# Patient Record
Sex: FEMALE | Race: WHITE | NOT HISPANIC OR LATINO | Employment: PART TIME | URBAN - METROPOLITAN AREA
[De-identification: names, ages, dates, MRNs, and addresses within clinical notes are randomized per-mention and may not be internally consistent; named-entity substitution may affect disease eponyms.]

---

## 2023-08-25 ENCOUNTER — HOSPITAL ENCOUNTER (OUTPATIENT)
Dept: DATA CONVERSION | Facility: HOSPITAL | Age: 43
Discharge: HOME | End: 2023-08-25
Payer: COMMERCIAL

## 2023-08-25 DIAGNOSIS — M54.2 CERVICALGIA: ICD-10-CM

## 2023-08-25 DIAGNOSIS — M54.12 RADICULOPATHY, CERVICAL REGION: ICD-10-CM

## 2023-09-17 ENCOUNTER — HOSPITAL ENCOUNTER (OUTPATIENT)
Dept: DATA CONVERSION | Facility: HOSPITAL | Age: 43
Discharge: HOME | End: 2023-09-17
Payer: COMMERCIAL

## 2023-09-17 DIAGNOSIS — M79.641 PAIN IN RIGHT HAND: ICD-10-CM

## 2023-09-17 DIAGNOSIS — M25.531 PAIN IN RIGHT WRIST: ICD-10-CM
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
## 2023-09-17 DIAGNOSIS — S62.300A UNSPECIFIED FRACTURE OF SECOND METACARPAL BONE, RIGHT HAND, INITIAL ENCOUNTER FOR CLOSED FRACTURE: ICD-10-CM

## 2023-09-17 DIAGNOSIS — S60.511A ABRASION OF RIGHT HAND, INITIAL ENCOUNTER: ICD-10-CM

## 2023-09-17 DIAGNOSIS — F17.210 NICOTINE DEPENDENCE, CIGARETTES, UNCOMPLICATED: ICD-10-CM

## 2023-09-25 PROBLEM — D25.9 UTERINE LEIOMYOMA: Status: ACTIVE | Noted: 2023-09-25

## 2023-09-25 PROBLEM — M54.12 CERVICAL RADICULOPATHY: Status: ACTIVE | Noted: 2023-09-25

## 2023-09-25 PROBLEM — I50.9 HEART FAILURE (MULTI): Status: ACTIVE | Noted: 2023-09-25

## 2023-09-25 PROBLEM — N93.9 ABNORMAL VAGINAL BLEEDING: Status: ACTIVE | Noted: 2023-09-25

## 2023-09-25 PROBLEM — S62.309A CLOSED FRACTURE OF METACARPAL BONE: Status: ACTIVE | Noted: 2023-09-25

## 2023-09-25 PROBLEM — E03.8 OTHER SPECIFIED HYPOTHYROIDISM: Status: ACTIVE | Noted: 2023-09-25

## 2023-09-25 PROBLEM — R42 LIGHTHEADEDNESS: Status: ACTIVE | Noted: 2023-09-25

## 2023-09-25 PROBLEM — K21.9 GASTROESOPHAGEAL REFLUX DISEASE: Status: ACTIVE | Noted: 2023-09-25

## 2023-09-25 PROBLEM — N94.6 DYSMENORRHEA: Status: ACTIVE | Noted: 2023-09-25

## 2023-09-25 RX ORDER — CLINDAMYCIN PHOSPHATE 10 MG/G
GEL TOPICAL
COMMUNITY
Start: 2023-01-30 | End: 2024-01-24 | Stop reason: ALTCHOICE

## 2023-10-16 PROBLEM — M79.641 PAIN IN RIGHT HAND: Status: ACTIVE | Noted: 2023-10-16

## 2023-10-16 PROBLEM — S62.309A: Status: ACTIVE | Noted: 2023-10-16

## 2023-10-16 RX ORDER — IBUPROFEN 600 MG/1
600 TABLET ORAL AS NEEDED
COMMUNITY

## 2023-10-23 ENCOUNTER — OFFICE VISIT (OUTPATIENT)
Dept: ORTHOPEDIC SURGERY | Facility: CLINIC | Age: 43
End: 2023-10-23
Payer: COMMERCIAL

## 2023-10-23 ENCOUNTER — HOSPITAL ENCOUNTER (OUTPATIENT)
Dept: RADIOLOGY | Facility: CLINIC | Age: 43
Discharge: HOME | End: 2023-10-23
Payer: COMMERCIAL

## 2023-10-23 VITALS — BODY MASS INDEX: 27.32 KG/M2 | WEIGHT: 170 LBS | HEIGHT: 66 IN

## 2023-10-23 DIAGNOSIS — S62.300D CLOSED NONDISPLACED FRACTURE OF SECOND METACARPAL BONE OF RIGHT HAND WITH ROUTINE HEALING, UNSPECIFIED PORTION OF METACARPAL, SUBSEQUENT ENCOUNTER: Primary | ICD-10-CM

## 2023-10-23 DIAGNOSIS — S62.91XA HAND FRACTURE, RIGHT: ICD-10-CM

## 2023-10-23 PROCEDURE — 73130 X-RAY EXAM OF HAND: CPT | Mod: RT,FY

## 2023-10-23 PROCEDURE — 99024 POSTOP FOLLOW-UP VISIT: CPT | Performed by: ORTHOPAEDIC SURGERY

## 2023-10-23 RX ORDER — GABAPENTIN 300 MG/1
300 CAPSULE ORAL 2 TIMES DAILY
COMMUNITY
Start: 2023-10-18 | End: 2024-01-24

## 2023-10-23 ASSESSMENT — PATIENT HEALTH QUESTIONNAIRE - PHQ9
1. LITTLE INTEREST OR PLEASURE IN DOING THINGS: NOT AT ALL
SUM OF ALL RESPONSES TO PHQ9 QUESTIONS 1 AND 2: 0
2. FEELING DOWN, DEPRESSED OR HOPELESS: NOT AT ALL

## 2023-10-23 ASSESSMENT — PAIN SCALES - GENERAL: PAINLEVEL_OUTOF10: 5 - MODERATE PAIN

## 2023-10-23 ASSESSMENT — ENCOUNTER SYMPTOMS
OCCASIONAL FEELINGS OF UNSTEADINESS: 0
LOSS OF SENSATION IN FEET: 0
DEPRESSION: 0

## 2023-10-23 ASSESSMENT — PAIN - FUNCTIONAL ASSESSMENT: PAIN_FUNCTIONAL_ASSESSMENT: 0-10

## 2023-10-23 NOTE — PROGRESS NOTES
Subjective    Patient ID: Yumiko Carranza is a 43 y.o. female.    Chief Complaint: Follow-up and Pain of the Right Hand     Last Surgery: No surgery found  Last Surgery Date: No surgery found    Previously evaluated this 43-year-old young woman for fracture of her right second metacarpal and treated her nonoperatively with splinting.  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        Objective   Ortho Exam  Right hand and upper extremity: The splint is removed.  Right second metacarpal position is clinically satisfactory.  There is some persistent tenderness over the right second metacarpal.  Skin is intact.  Neurovascular is intact.  X-rays of the right hand done and read in the office today show that the previously noted fracture of the right second metacarpal is maintained in overall satisfactory position.  Early callus appears to be present consistent with early healing.  I reviewed these x-rays with the patient in the office today.  Image Results:  XR hand right 3+ views  These images are not reportable by radiology and will not be interpreted   by  Radiologists.  Options are discussed with the patient in detail.  The patient's request a new splint is fabricated for the patient in the office today to protect her right second metacarpal fracture.  The patient is instructed regarding activity modification and risk for further injury with falling or trauma or bumping, maintaining the splint while working,, ice, elevation, physician directed at home gentle strengthening and ROM exercises, and the appropriate use of Tylenol as needed for pain with its potential adverse reactions and side effects. The patient understands.  Return in 2 months for reevaluation and melinda-ray of the right hand or sooner as needed.  Please note that this report has been  produced using speech recognition software.  It may contain errors related to grammar, punctuation or spelling.  Electronically signed, but not reviewed.    Assessment/Plan   Encounter Diagnoses:  Closed nondisplaced fracture of second metacarpal bone of right hand with routine healing, unspecified portion of metacarpal, subsequent encounter    No orders of the defined types were placed in this encounter.    No follow-ups on file.

## 2024-01-24 ENCOUNTER — OFFICE VISIT (OUTPATIENT)
Dept: ORTHOPEDIC SURGERY | Facility: CLINIC | Age: 44
End: 2024-01-24
Payer: COMMERCIAL

## 2024-01-24 ENCOUNTER — HOSPITAL ENCOUNTER (OUTPATIENT)
Dept: RADIOLOGY | Facility: CLINIC | Age: 44
Discharge: HOME | End: 2024-01-24
Payer: COMMERCIAL

## 2024-01-24 DIAGNOSIS — S62.300D CLOSED NONDISPLACED FRACTURE OF SECOND METACARPAL BONE OF RIGHT HAND WITH ROUTINE HEALING, UNSPECIFIED PORTION OF METACARPAL, SUBSEQUENT ENCOUNTER: Primary | ICD-10-CM

## 2024-01-24 DIAGNOSIS — S62.109A WRIST FRACTURE: ICD-10-CM

## 2024-01-24 PROBLEM — E78.00 PURE HYPERCHOLESTEROLEMIA: Status: ACTIVE | Noted: 2020-10-28

## 2024-01-24 PROCEDURE — 73110 X-RAY EXAM OF WRIST: CPT | Mod: RT

## 2024-01-24 PROCEDURE — 99213 OFFICE O/P EST LOW 20 MIN: CPT | Performed by: ORTHOPAEDIC SURGERY

## 2024-01-24 PROCEDURE — 73110 X-RAY EXAM OF WRIST: CPT | Mod: RIGHT SIDE | Performed by: ORTHOPAEDIC SURGERY

## 2024-01-24 ASSESSMENT — PAIN DESCRIPTION - DESCRIPTORS: DESCRIPTORS: THROBBING

## 2024-01-24 ASSESSMENT — PAIN SCALES - GENERAL: PAINLEVEL_OUTOF10: 2

## 2024-01-24 ASSESSMENT — PAIN - FUNCTIONAL ASSESSMENT: PAIN_FUNCTIONAL_ASSESSMENT: 0-10

## 2024-01-24 NOTE — PROGRESS NOTES
Subjective    Patient ID: Yumiko Carranza is a 43 y.o. female.    Chief Complaint: Follow-up and Pain of the Right Hand     Last Surgery: No surgery found  Last Surgery Date: No surgery found    I previously evaluated this 43-year-old young woman for fracture of her right second metacarpal and treated her nonoperatively with splinting.  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        Objective   Ortho Exam  Right hand and upper extremity: The splint is removed.  Right second metacarpal position is clinically satisfactory.  There is some persistent tenderness over the right second metacarpal.  Skin is intact.  Neurovascular is intact.  X-rays of the right hand done and read in the office today show that the previously noted fracture of the right second metacarpal is maintained in overall satisfactory position.  Early callus appears to be present consistent with early healing.  I reviewed these x-rays with the patient in the office today.  Image Results:  XR hand right 3+ views  These images are not reportable by radiology and will not be interpreted   by  Radiologists.  Options are discussed with the patient in detail.   The patient is instructed regarding activity modification and risk for further injury with falling or trauma or bumping, maintaining the splint while working,, ice, elevation, physician directed at home gentle strengthening and ROM exercises, and the appropriate use of Tylenol as needed for pain with its potential adverse reactions and side effects. The patient understands.  Return ias needed.  Please note that this report has been produced using speech recognition software.  It may contain errors related to grammar, punctuation or spelling.  Electronically signed, but not reviewed.    Assessment/Plan   Encounter Diagnoses:  Closed nondisplaced fracture of second  metacarpal bone of right hand with routine healing, unspecified portion of metacarpal, subsequent encounter    No orders of the defined types were placed in this encounter.    No follow-ups on file.

## 2024-02-20 ENCOUNTER — APPOINTMENT (OUTPATIENT)
Dept: CARDIOLOGY | Facility: HOSPITAL | Age: 44
End: 2024-02-20
Payer: COMMERCIAL

## 2024-02-20 ENCOUNTER — APPOINTMENT (OUTPATIENT)
Dept: RADIOLOGY | Facility: HOSPITAL | Age: 44
End: 2024-02-20
Payer: COMMERCIAL

## 2024-02-20 ENCOUNTER — HOSPITAL ENCOUNTER (EMERGENCY)
Facility: HOSPITAL | Age: 44
Discharge: HOME | End: 2024-02-20
Attending: STUDENT IN AN ORGANIZED HEALTH CARE EDUCATION/TRAINING PROGRAM
Payer: COMMERCIAL

## 2024-02-20 VITALS
HEART RATE: 61 BPM | SYSTOLIC BLOOD PRESSURE: 98 MMHG | BODY MASS INDEX: 25.9 KG/M2 | DIASTOLIC BLOOD PRESSURE: 59 MMHG | OXYGEN SATURATION: 100 % | TEMPERATURE: 98.1 F | RESPIRATION RATE: 22 BRPM | HEIGHT: 67 IN | WEIGHT: 165 LBS

## 2024-02-20 DIAGNOSIS — R11.0 NAUSEA: Primary | ICD-10-CM

## 2024-02-20 DIAGNOSIS — R07.9 CHEST PAIN, UNSPECIFIED TYPE: ICD-10-CM

## 2024-02-20 LAB
ALBUMIN SERPL-MCNC: 4.8 G/DL (ref 3.5–5)
ALP BLD-CCNC: 47 U/L (ref 35–125)
ALT SERPL-CCNC: 13 U/L (ref 5–40)
ANION GAP SERPL CALC-SCNC: 12 MMOL/L
AST SERPL-CCNC: 17 U/L (ref 5–40)
BASOPHILS # BLD AUTO: 0.05 X10*3/UL (ref 0–0.1)
BASOPHILS NFR BLD AUTO: 0.5 %
BILIRUB SERPL-MCNC: 0.5 MG/DL (ref 0.1–1.2)
BUN SERPL-MCNC: 10 MG/DL (ref 8–25)
CALCIUM SERPL-MCNC: 9.7 MG/DL (ref 8.5–10.4)
CHLORIDE SERPL-SCNC: 100 MMOL/L (ref 97–107)
CO2 SERPL-SCNC: 24 MMOL/L (ref 24–31)
CREAT SERPL-MCNC: 0.6 MG/DL (ref 0.4–1.6)
EGFRCR SERPLBLD CKD-EPI 2021: >90 ML/MIN/1.73M*2
EOSINOPHIL # BLD AUTO: 0.23 X10*3/UL (ref 0–0.7)
EOSINOPHIL NFR BLD AUTO: 2.2 %
ERYTHROCYTE [DISTWIDTH] IN BLOOD BY AUTOMATED COUNT: 15.9 % (ref 11.5–14.5)
GLUCOSE SERPL-MCNC: 85 MG/DL (ref 65–99)
HCT VFR BLD AUTO: 41.3 % (ref 36–46)
HGB BLD-MCNC: 13.6 G/DL (ref 12–16)
IMM GRANULOCYTES # BLD AUTO: 0.03 X10*3/UL (ref 0–0.7)
IMM GRANULOCYTES NFR BLD AUTO: 0.3 % (ref 0–0.9)
LIPASE SERPL-CCNC: 25 U/L (ref 16–63)
LYMPHOCYTES # BLD AUTO: 3.6 X10*3/UL (ref 1.2–4.8)
LYMPHOCYTES NFR BLD AUTO: 34.1 %
MAGNESIUM SERPL-MCNC: 2 MG/DL (ref 1.6–3.1)
MCH RBC QN AUTO: 27.8 PG (ref 26–34)
MCHC RBC AUTO-ENTMCNC: 32.9 G/DL (ref 32–36)
MCV RBC AUTO: 84 FL (ref 80–100)
MONOCYTES # BLD AUTO: 0.67 X10*3/UL (ref 0.1–1)
MONOCYTES NFR BLD AUTO: 6.3 %
NEUTROPHILS # BLD AUTO: 5.98 X10*3/UL (ref 1.2–7.7)
NEUTROPHILS NFR BLD AUTO: 56.6 %
NRBC BLD-RTO: 0 /100 WBCS (ref 0–0)
PLATELET # BLD AUTO: 298 X10*3/UL (ref 150–450)
POTASSIUM SERPL-SCNC: 3.8 MMOL/L (ref 3.4–5.1)
PROT SERPL-MCNC: 8.2 G/DL (ref 5.9–7.9)
RBC # BLD AUTO: 4.9 X10*6/UL (ref 4–5.2)
SODIUM SERPL-SCNC: 136 MMOL/L (ref 133–145)
TROPONIN T SERPL-MCNC: <6 NG/L
WBC # BLD AUTO: 10.6 X10*3/UL (ref 4.4–11.3)

## 2024-02-20 PROCEDURE — 83735 ASSAY OF MAGNESIUM: CPT | Performed by: STUDENT IN AN ORGANIZED HEALTH CARE EDUCATION/TRAINING PROGRAM

## 2024-02-20 PROCEDURE — 84484 ASSAY OF TROPONIN QUANT: CPT | Performed by: STUDENT IN AN ORGANIZED HEALTH CARE EDUCATION/TRAINING PROGRAM

## 2024-02-20 PROCEDURE — 93005 ELECTROCARDIOGRAM TRACING: CPT | Mod: 59

## 2024-02-20 PROCEDURE — 99284 EMERGENCY DEPT VISIT MOD MDM: CPT | Mod: 25

## 2024-02-20 PROCEDURE — 2500000004 HC RX 250 GENERAL PHARMACY W/ HCPCS (ALT 636 FOR OP/ED): Performed by: STUDENT IN AN ORGANIZED HEALTH CARE EDUCATION/TRAINING PROGRAM

## 2024-02-20 PROCEDURE — 85025 COMPLETE CBC W/AUTO DIFF WBC: CPT | Performed by: STUDENT IN AN ORGANIZED HEALTH CARE EDUCATION/TRAINING PROGRAM

## 2024-02-20 PROCEDURE — 80053 COMPREHEN METABOLIC PANEL: CPT | Performed by: STUDENT IN AN ORGANIZED HEALTH CARE EDUCATION/TRAINING PROGRAM

## 2024-02-20 PROCEDURE — 96361 HYDRATE IV INFUSION ADD-ON: CPT

## 2024-02-20 PROCEDURE — 83690 ASSAY OF LIPASE: CPT | Performed by: STUDENT IN AN ORGANIZED HEALTH CARE EDUCATION/TRAINING PROGRAM

## 2024-02-20 PROCEDURE — 93005 ELECTROCARDIOGRAM TRACING: CPT

## 2024-02-20 PROCEDURE — 96375 TX/PRO/DX INJ NEW DRUG ADDON: CPT

## 2024-02-20 PROCEDURE — 96374 THER/PROPH/DIAG INJ IV PUSH: CPT

## 2024-02-20 PROCEDURE — 71046 X-RAY EXAM CHEST 2 VIEWS: CPT

## 2024-02-20 PROCEDURE — 36415 COLL VENOUS BLD VENIPUNCTURE: CPT | Performed by: STUDENT IN AN ORGANIZED HEALTH CARE EDUCATION/TRAINING PROGRAM

## 2024-02-20 RX ORDER — FAMOTIDINE 10 MG/ML
20 INJECTION INTRAVENOUS ONCE
Status: COMPLETED | OUTPATIENT
Start: 2024-02-20 | End: 2024-02-20

## 2024-02-20 RX ORDER — ONDANSETRON HYDROCHLORIDE 2 MG/ML
4 INJECTION, SOLUTION INTRAVENOUS ONCE
Status: COMPLETED | OUTPATIENT
Start: 2024-02-20 | End: 2024-02-20

## 2024-02-20 RX ADMIN — SODIUM CHLORIDE 1000 ML: 900 INJECTION, SOLUTION INTRAVENOUS at 19:13

## 2024-02-20 RX ADMIN — ONDANSETRON 4 MG: 2 INJECTION INTRAMUSCULAR; INTRAVENOUS at 19:13

## 2024-02-20 RX ADMIN — FAMOTIDINE 20 MG: 10 INJECTION, SOLUTION INTRAVENOUS at 20:10

## 2024-02-20 ASSESSMENT — LIFESTYLE VARIABLES
EVER HAD A DRINK FIRST THING IN THE MORNING TO STEADY YOUR NERVES TO GET RID OF A HANGOVER: NO
HAVE PEOPLE ANNOYED YOU BY CRITICIZING YOUR DRINKING: NO
EVER FELT BAD OR GUILTY ABOUT YOUR DRINKING: NO
HAVE YOU EVER FELT YOU SHOULD CUT DOWN ON YOUR DRINKING: NO

## 2024-02-20 ASSESSMENT — PAIN - FUNCTIONAL ASSESSMENT: PAIN_FUNCTIONAL_ASSESSMENT: 0-10

## 2024-02-20 ASSESSMENT — COLUMBIA-SUICIDE SEVERITY RATING SCALE - C-SSRS
6. HAVE YOU EVER DONE ANYTHING, STARTED TO DO ANYTHING, OR PREPARED TO DO ANYTHING TO END YOUR LIFE?: NO
2. HAVE YOU ACTUALLY HAD ANY THOUGHTS OF KILLING YOURSELF?: NO
1. IN THE PAST MONTH, HAVE YOU WISHED YOU WERE DEAD OR WISHED YOU COULD GO TO SLEEP AND NOT WAKE UP?: NO

## 2024-02-20 ASSESSMENT — PAIN SCALES - GENERAL
PAINLEVEL_OUTOF10: 2
PAINLEVEL_OUTOF10: 2

## 2024-02-21 LAB
ATRIAL RATE: 57 BPM
P AXIS: 48 DEGREES
P OFFSET: 201 MS
P ONSET: 144 MS
PR INTERVAL: 146 MS
Q ONSET: 217 MS
QRS COUNT: 10 BEATS
QRS DURATION: 94 MS
QT INTERVAL: 434 MS
QTC CALCULATION(BAZETT): 422 MS
QTC FREDERICIA: 426 MS
R AXIS: 76 DEGREES
T AXIS: 59 DEGREES
T OFFSET: 434 MS
VENTRICULAR RATE: 57 BPM

## 2024-02-21 ASSESSMENT — HEART SCORE
TROPONIN: LESS THAN OR EQUAL TO NORMAL LIMIT
RISK FACTORS: 1-2 RISK FACTORS
ECG: NORMAL
AGE: <45
HISTORY: SLIGHTLY SUSPICIOUS
HEART SCORE: 1

## 2024-02-21 NOTE — ED NOTES
Report received. This RN assumed care. IV placed by medic. Labs sent. RN placed pt on the monitor and medicated. Call light in reach. No distress noted. Plan of care ongoing.      Mercedes Grimes RN  02/20/24 0554

## 2024-02-21 NOTE — ED NOTES
Discharge planning discussed with pt and daughter. Pt had no questions and agreed to follow up care. Pt ambulatory at discharge with no distress.      Mercedes Grimes RN  02/20/24 6524

## 2024-02-24 ENCOUNTER — APPOINTMENT (OUTPATIENT)
Dept: RADIOLOGY | Facility: HOSPITAL | Age: 44
End: 2024-02-24
Payer: COMMERCIAL

## 2024-02-24 ENCOUNTER — APPOINTMENT (OUTPATIENT)
Dept: CARDIOLOGY | Facility: HOSPITAL | Age: 44
End: 2024-02-24
Payer: COMMERCIAL

## 2024-02-24 ENCOUNTER — HOSPITAL ENCOUNTER (EMERGENCY)
Facility: HOSPITAL | Age: 44
Discharge: HOME | End: 2024-02-24
Attending: STUDENT IN AN ORGANIZED HEALTH CARE EDUCATION/TRAINING PROGRAM
Payer: COMMERCIAL

## 2024-02-24 VITALS
HEIGHT: 66 IN | RESPIRATION RATE: 16 BRPM | HEART RATE: 72 BPM | WEIGHT: 170 LBS | BODY MASS INDEX: 27.32 KG/M2 | OXYGEN SATURATION: 98 % | DIASTOLIC BLOOD PRESSURE: 74 MMHG | TEMPERATURE: 98.2 F | SYSTOLIC BLOOD PRESSURE: 125 MMHG

## 2024-02-24 DIAGNOSIS — R10.13 ABDOMINAL PAIN, EPIGASTRIC: Primary | ICD-10-CM

## 2024-02-24 LAB
ALBUMIN SERPL-MCNC: 4.4 G/DL (ref 3.5–5)
ALP BLD-CCNC: 48 U/L (ref 35–125)
ALT SERPL-CCNC: 12 U/L (ref 5–40)
ANION GAP SERPL CALC-SCNC: 9 MMOL/L
APPEARANCE UR: CLEAR
AST SERPL-CCNC: 16 U/L (ref 5–40)
BACTERIA #/AREA URNS AUTO: ABNORMAL /HPF
BASOPHILS # BLD AUTO: 0.06 X10*3/UL (ref 0–0.1)
BASOPHILS NFR BLD AUTO: 0.7 %
BILIRUB SERPL-MCNC: 0.3 MG/DL (ref 0.1–1.2)
BILIRUB UR STRIP.AUTO-MCNC: NEGATIVE MG/DL
BUN SERPL-MCNC: 11 MG/DL (ref 8–25)
CALCIUM SERPL-MCNC: 9.3 MG/DL (ref 8.5–10.4)
CHLORIDE SERPL-SCNC: 100 MMOL/L (ref 97–107)
CO2 SERPL-SCNC: 27 MMOL/L (ref 24–31)
COLOR UR: COLORLESS
CREAT SERPL-MCNC: 0.7 MG/DL (ref 0.4–1.6)
EGFRCR SERPLBLD CKD-EPI 2021: >90 ML/MIN/1.73M*2
EOSINOPHIL # BLD AUTO: 0.33 X10*3/UL (ref 0–0.7)
EOSINOPHIL NFR BLD AUTO: 4 %
ERYTHROCYTE [DISTWIDTH] IN BLOOD BY AUTOMATED COUNT: 15.6 % (ref 11.5–14.5)
GLUCOSE SERPL-MCNC: 91 MG/DL (ref 65–99)
GLUCOSE UR STRIP.AUTO-MCNC: NORMAL MG/DL
HCG SERPL-ACNC: <1 MIU/ML
HCT VFR BLD AUTO: 41.3 % (ref 36–46)
HGB BLD-MCNC: 13.4 G/DL (ref 12–16)
IMM GRANULOCYTES # BLD AUTO: 0.02 X10*3/UL (ref 0–0.7)
IMM GRANULOCYTES NFR BLD AUTO: 0.2 % (ref 0–0.9)
KETONES UR STRIP.AUTO-MCNC: NEGATIVE MG/DL
LEUKOCYTE ESTERASE UR QL STRIP.AUTO: ABNORMAL
LIPASE SERPL-CCNC: 28 U/L (ref 16–63)
LYMPHOCYTES # BLD AUTO: 2.54 X10*3/UL (ref 1.2–4.8)
LYMPHOCYTES NFR BLD AUTO: 30.5 %
MCH RBC QN AUTO: 27.1 PG (ref 26–34)
MCHC RBC AUTO-ENTMCNC: 32.4 G/DL (ref 32–36)
MCV RBC AUTO: 84 FL (ref 80–100)
MONOCYTES # BLD AUTO: 0.52 X10*3/UL (ref 0.1–1)
MONOCYTES NFR BLD AUTO: 6.3 %
NEUTROPHILS # BLD AUTO: 4.85 X10*3/UL (ref 1.2–7.7)
NEUTROPHILS NFR BLD AUTO: 58.3 %
NITRITE UR QL STRIP.AUTO: NEGATIVE
NRBC BLD-RTO: 0 /100 WBCS (ref 0–0)
PH UR STRIP.AUTO: 6.5 [PH]
PLATELET # BLD AUTO: 304 X10*3/UL (ref 150–450)
POTASSIUM SERPL-SCNC: 3.8 MMOL/L (ref 3.4–5.1)
PROT SERPL-MCNC: 7.6 G/DL (ref 5.9–7.9)
PROT UR STRIP.AUTO-MCNC: NEGATIVE MG/DL
RBC # BLD AUTO: 4.94 X10*6/UL (ref 4–5.2)
RBC # UR STRIP.AUTO: NEGATIVE /UL
RBC #/AREA URNS AUTO: ABNORMAL /HPF
SODIUM SERPL-SCNC: 136 MMOL/L (ref 133–145)
SP GR UR STRIP.AUTO: 1.05
SQUAMOUS #/AREA URNS AUTO: ABNORMAL /HPF
TROPONIN T SERPL-MCNC: <6 NG/L
UROBILINOGEN UR STRIP.AUTO-MCNC: NORMAL MG/DL
WBC # BLD AUTO: 8.3 X10*3/UL (ref 4.4–11.3)
WBC #/AREA URNS AUTO: ABNORMAL /HPF

## 2024-02-24 PROCEDURE — 96361 HYDRATE IV INFUSION ADD-ON: CPT

## 2024-02-24 PROCEDURE — 74177 CT ABD & PELVIS W/CONTRAST: CPT

## 2024-02-24 PROCEDURE — 87086 URINE CULTURE/COLONY COUNT: CPT | Mod: WESLAB

## 2024-02-24 PROCEDURE — 83690 ASSAY OF LIPASE: CPT

## 2024-02-24 PROCEDURE — 36415 COLL VENOUS BLD VENIPUNCTURE: CPT

## 2024-02-24 PROCEDURE — 96375 TX/PRO/DX INJ NEW DRUG ADDON: CPT

## 2024-02-24 PROCEDURE — 81001 URINALYSIS AUTO W/SCOPE: CPT

## 2024-02-24 PROCEDURE — 80053 COMPREHEN METABOLIC PANEL: CPT

## 2024-02-24 PROCEDURE — 2500000004 HC RX 250 GENERAL PHARMACY W/ HCPCS (ALT 636 FOR OP/ED)

## 2024-02-24 PROCEDURE — 2550000001 HC RX 255 CONTRASTS

## 2024-02-24 PROCEDURE — 85025 COMPLETE CBC W/AUTO DIFF WBC: CPT

## 2024-02-24 PROCEDURE — 93005 ELECTROCARDIOGRAM TRACING: CPT

## 2024-02-24 PROCEDURE — 96374 THER/PROPH/DIAG INJ IV PUSH: CPT | Mod: 59

## 2024-02-24 PROCEDURE — 96372 THER/PROPH/DIAG INJ SC/IM: CPT

## 2024-02-24 PROCEDURE — 84702 CHORIONIC GONADOTROPIN TEST: CPT

## 2024-02-24 PROCEDURE — 2550000001 HC RX 255 CONTRASTS: Performed by: STUDENT IN AN ORGANIZED HEALTH CARE EDUCATION/TRAINING PROGRAM

## 2024-02-24 PROCEDURE — 84484 ASSAY OF TROPONIN QUANT: CPT

## 2024-02-24 PROCEDURE — 99285 EMERGENCY DEPT VISIT HI MDM: CPT | Mod: 25

## 2024-02-24 RX ORDER — SUCRALFATE 1 G/1
1 TABLET ORAL
Qty: 20 TABLET | Refills: 0 | Status: SHIPPED | OUTPATIENT
Start: 2024-02-24 | End: 2024-03-05

## 2024-02-24 RX ORDER — ONDANSETRON HYDROCHLORIDE 2 MG/ML
4 INJECTION, SOLUTION INTRAVENOUS ONCE
Status: COMPLETED | OUTPATIENT
Start: 2024-02-24 | End: 2024-02-24

## 2024-02-24 RX ORDER — KETOROLAC TROMETHAMINE 30 MG/ML
15 INJECTION, SOLUTION INTRAMUSCULAR; INTRAVENOUS ONCE
Status: COMPLETED | OUTPATIENT
Start: 2024-02-24 | End: 2024-02-24

## 2024-02-24 RX ORDER — FAMOTIDINE 10 MG/ML
20 INJECTION INTRAVENOUS ONCE
Status: COMPLETED | OUTPATIENT
Start: 2024-02-24 | End: 2024-02-24

## 2024-02-24 RX ORDER — ALUMINUM HYDROXIDE AND MAGNESIUM CARBONATE 95; 358 MG/15ML; MG/15ML
5 LIQUID ORAL EVERY 6 HOURS PRN
Qty: 355 ML | Refills: 0 | Status: SHIPPED | OUTPATIENT
Start: 2024-02-24

## 2024-02-24 RX ADMIN — ONDANSETRON 4 MG: 2 INJECTION INTRAMUSCULAR; INTRAVENOUS at 11:39

## 2024-02-24 RX ADMIN — FAMOTIDINE 20 MG: 10 INJECTION, SOLUTION INTRAVENOUS at 11:39

## 2024-02-24 RX ADMIN — SODIUM CHLORIDE 1000 ML: 900 INJECTION, SOLUTION INTRAVENOUS at 11:38

## 2024-02-24 RX ADMIN — IOHEXOL 75 ML: 350 INJECTION, SOLUTION INTRAVENOUS at 12:30

## 2024-02-24 RX ADMIN — KETOROLAC TROMETHAMINE 15 MG: 30 INJECTION INTRAMUSCULAR; INTRAVENOUS at 11:39

## 2024-02-24 ASSESSMENT — LIFESTYLE VARIABLES
HAVE PEOPLE ANNOYED YOU BY CRITICIZING YOUR DRINKING: NO
EVER HAD A DRINK FIRST THING IN THE MORNING TO STEADY YOUR NERVES TO GET RID OF A HANGOVER: NO
EVER FELT BAD OR GUILTY ABOUT YOUR DRINKING: NO
HAVE YOU EVER FELT YOU SHOULD CUT DOWN ON YOUR DRINKING: NO

## 2024-02-24 ASSESSMENT — COLUMBIA-SUICIDE SEVERITY RATING SCALE - C-SSRS
2. HAVE YOU ACTUALLY HAD ANY THOUGHTS OF KILLING YOURSELF?: NO
6. HAVE YOU EVER DONE ANYTHING, STARTED TO DO ANYTHING, OR PREPARED TO DO ANYTHING TO END YOUR LIFE?: NO
1. IN THE PAST MONTH, HAVE YOU WISHED YOU WERE DEAD OR WISHED YOU COULD GO TO SLEEP AND NOT WAKE UP?: NO

## 2024-02-24 ASSESSMENT — PAIN DESCRIPTION - DESCRIPTORS: DESCRIPTORS: CRAMPING

## 2024-02-24 NOTE — ED PROVIDER NOTES
HPI   Chief Complaint   Patient presents with    Abdominal Pain       43-year-old female with no significant past medical history presenting for epigastric abdominal pain that has been ongoing for over the past week.  Patient states the pain has been constant.  She states she has not been taking anything for the pain.  She states she was seen in the ER a few days ago for epigastric pain with chest pain and her workup was found to be negative she was told to take over-the-counter Pepcid.  Patient states that she bought Pepcid but has not been taking the medication.  She is endorsing 1 episode of nausea and vomiting but otherwise no repeat episodes.  No fever or chills.  No change in bowel habitus.  No chest pain, shortness of breath, headache, dizziness.  No urinary symptoms.  All other review of systems otherwise negative.                        [place] Coma Scale Score: 15                     Patient History   No past medical history on file.  No past surgical history on file.  Family History   Problem Relation Name Age of Onset    Hypertension Mother      Heart disease Father      Asthma Sister       Social History     Tobacco Use    Smoking status: Every Day     Packs/day: .5     Types: Cigarettes     Passive exposure: Current    Smokeless tobacco: Never   Vaping Use    Vaping Use: Not on file   Substance Use Topics    Alcohol use: Yes     Comment: on occassion    Drug use: Never       Physical Exam   ED Triage Vitals [02/24/24 1026]   Temperature Heart Rate Respirations BP   36.8 °C (98.2 °F) 70 14 123/72      Pulse Ox Temp src Heart Rate Source Patient Position   100 % -- -- --      BP Location FiO2 (%)     -- --       Physical Exam  Vitals and nursing note reviewed.   Constitutional:       General: She is not in acute distress.     Appearance: She is well-developed.      Comments: Resting comfortably in hospital chair   HENT:      Head: Normocephalic and atraumatic.   Eyes:      Extraocular Movements:  Extraocular movements intact.      Pupils: Pupils are equal, round, and reactive to light.   Cardiovascular:      Rate and Rhythm: Normal rate and regular rhythm.      Heart sounds: Normal heart sounds.   Pulmonary:      Effort: Pulmonary effort is normal.      Breath sounds: Normal breath sounds. No wheezing, rhonchi or rales.   Abdominal:      Comments: Nondistended.  Tenderness in the epigastric region.  Otherwise nontender.   Skin:     General: Skin is warm and dry.      Coloration: Skin is not jaundiced or pale.   Neurological:      General: No focal deficit present.      Mental Status: She is alert and oriented to person, place, and time.   Psychiatric:         Mood and Affect: Mood normal.         Behavior: Behavior normal.         ED Course & MDM   ED Course as of 02/24/24 1615   Sat Feb 24, 2024   1419 EKG Time:1034  EKG Interpretation time:1035  EKG Interpretation: EKG shows normal sinus rhythm with a rate of 75 bpm, normal axis, QTc 428, no evidence of STEMI.    EKG was interpreted by myself independently [JL]   2204 Attending MDM:  43-year-old female presents emergency room with epigastric abdominal pain.  Patient was seen in the emergency room a few days ago and workup at that time was unremarkable and instructed to follow-up outpatient.  Patient was prescribed famotidine but has not been consistent in taking it at home.  Patient expresses aversions to acidic foods, salty foods, oily foods and has been slowly increasing her oral intake with no significant tenderness but noting continued epigastric abdominal pain.  She otherwise denies any significant vomiting, diarrhea, constipation, fevers, chills.    Vital signs as interpreted by me: Afebrile, nontachycardic, normotensive, 100% on room air    Exam:    Constitutional: No acute distress. Resting comfortably.   Head: Normocephalic, atraumatic.   Eyes: Pupils equal bilaterally, EOM grossly intact, conjunctiva normal.  Mouth/Throat: Oropharynx is clear,  moist mucus membranes.   Neck: Supple. No lymphadenopathy.  Cardiovascular: Regular rate and regular rhythm. Extremities are well-perfused.    Pulmonary/Chest: No respiratory distress, breathing comfortably on room air.    Abdominal: Soft, non-tender, non-distended. No rebound or guarding.   Musculoskeletal: No lower extremity edema.       Skin: Warm, dry, and intact.   Neurological: Patient is oriented to person, place, time, and situation. Face symmetric, hearing intact to voice, speech normal. Moves all extremities.   Psych: Mood, affect, thought content, and judgment normal.    Differential includes but not limited to:  Pancreatitis versus UTI versus nephrolithiasis versus pyelonephritis versus appendicitis versus GERD versus peptic ulcer [DH]   1440 Lab work as interpreted by me shows no significant leukocytosis or anemia on CBC and otherwise comprehensive metabolic panel within normal limits no significant LFT abnormality.  Troponin is negative and lipase is negative.  Urinalysis shows no evidence of UTI and CT abdomen pelvis per radiology with no acute findings at this time such as pancreatitis or intra-abdominal pathology. [DH]   1440 Patient has follow-up outpatient this coming week with her primary care provider with ultimate disposition to be seen by gastroenterology for possible endoscopy for possible peptic ulcer disease. [DH]      ED Course User Index  [DH] Xavi Malone MD  [JL] Peterson Peralta, DO         Diagnoses as of 02/24/24 1615   Abdominal pain, epigastric       Medical Decision Making  Parts of this chart have been completed using voice recognition software. Please excuse any errors of transcription.  My thought process and reason for plan has been formulated from the time that I saw the patient until the time of disposition and is not specific to one specific moment during their visit and furthermore my MDM encompasses this entire chart and not only this text box.      HPI: Detailed  above.    Exam: A medically appropriate exam performed, outlined above, given the known history and presentation.    History obtained from: Patient    EKG: Nonischemic    Social Determinants of Health considered during this visit: Lives independently    Medications given during visit:  Medications   sodium chloride 0.9 % bolus 1,000 mL (1,000 mL intravenous New Bag 2/24/24 1138)   ketorolac (Toradol) injection 15 mg (15 mg intramuscular Given 2/24/24 1139)   famotidine PF (Pepcid) injection 20 mg (20 mg intravenous Given 2/24/24 1139)   ondansetron (Zofran) injection 4 mg (4 mg intravenous Given 2/24/24 1139)   iohexol (OMNIPaque) 350 mg iodine/mL solution 75 mL (75 mL intravenous Given 2/24/24 1230)        Diagnostic/tests  Labs Reviewed   CBC WITH AUTO DIFFERENTIAL - Abnormal       Result Value    WBC 8.3      nRBC 0.0      RBC 4.94      Hemoglobin 13.4      Hematocrit 41.3      MCV 84      MCH 27.1      MCHC 32.4      RDW 15.6 (*)     Platelets 304      Neutrophils % 58.3      Immature Granulocytes %, Automated 0.2      Lymphocytes % 30.5      Monocytes % 6.3      Eosinophils % 4.0      Basophils % 0.7      Neutrophils Absolute 4.85      Immature Granulocytes Absolute, Automated 0.02      Lymphocytes Absolute 2.54      Monocytes Absolute 0.52      Eosinophils Absolute 0.33      Basophils Absolute 0.06     URINALYSIS WITH REFLEX CULTURE AND MICROSCOPIC - Abnormal    Color, Urine Colorless (*)     Appearance, Urine Clear      Specific Gravity, Urine 1.050 (*)     pH, Urine 6.5      Protein, Urine NEGATIVE      Glucose, Urine Normal      Blood, Urine NEGATIVE      Ketones, Urine NEGATIVE      Bilirubin, Urine NEGATIVE      Urobilinogen, Urine Normal      Nitrite, Urine NEGATIVE      Leukocyte Esterase, Urine 75 Gillian/µL (*)    MICROSCOPIC ONLY, URINE - Abnormal    WBC, Urine 1-5      RBC, Urine 1-2      Squamous Epithelial Cells, Urine 1-9 (SPARSE)      Bacteria, Urine 1+ (*)    COMPREHENSIVE METABOLIC PANEL - Normal     Glucose 91      Sodium 136      Potassium 3.8      Chloride 100      Bicarbonate 27      Urea Nitrogen 11      Creatinine 0.70      eGFR >90      Calcium 9.3      Albumin 4.4      Alkaline Phosphatase 48      Total Protein 7.6      AST 16      Bilirubin, Total 0.3      ALT 12      Anion Gap 9     LIPASE - Normal    Lipase 28     TROPONIN T, HIGH SENSITIVITY - Normal    Troponin T, High Sensitivity <6     URINE CULTURE   HUMAN CHORIONIC GONADOTROPIN, SERUM QUANTITATIVE    HCG, Beta-Quantitative <1     URINALYSIS WITH REFLEX CULTURE AND MICROSCOPIC    Narrative:     The following orders were created for panel order Urinalysis with Reflex Culture and Microscopic.  Procedure                               Abnormality         Status                     ---------                               -----------         ------                     Urinalysis with Reflex C...[[id number]]  Abnormal            Final result               Extra Urine Gray Tube[[id number]]                                                         Please view results for these tests on the individual orders.   EXTRA URINE GRAY TUBE      CT abdomen pelvis w IV contrast   Final Result   No acute process in the abdomen and pelvis.        Signed by: Ant Dinero 2/24/2024 1:46 PM   Dictation workstation:   [id number]           Considerations/further MDM:  43-year-old female presenting to ER for evaluation of epigastric pain.  During the ER visit the patient is alert and oriented, resting comfortably in exam chair in no acute distress.  Her vital signs are within normal limits during this visit.  Abdominal exam is remarkable for epigastric abdominal tenderness but no rebound or guarding.  Initial differential diagnoses considered include pancreatitis, cholecystitis, ACS, pregnancy, hepatitis, gastritis, viral illness, appendicitis, nephrolithiasis, pyelonephritis.  Basic laboratory workup was unremarkable.  Patient was given fluids, Toradol, Zofran and  Pepcid for symptoms while in the ER.  CT abdomen pelvis was performed and was negative for any acute process in the abdomen.  Patient reported significant improvement in her symptoms during the ER visit and remained well-appearing while in the ER.  Thus I believe she is appropriate for discharge at this time with appropriate follow-up within the next week with primary care.  This was discussed with the patient as well as the results of the laboratory and CT findings.  The patient is agreeable to this plan.  She was provided a prescription for sucralfate.  She was instructed to present back to ER if she develops any worsening of symptoms.  She states her understanding of the treatment plan at this time and is agreeable.  She was discharged home in stable condition.      Procedure  Procedures     Ami Cooper PA-C  02/24/24 2266

## 2024-02-24 NOTE — ED TRIAGE NOTES
Diffuse abd pain starting a week ago, started R-flank, radiating down back, now diffuse burning/stabbing pain. No GI issues noted. Vomited once yesterday, Pts appetite has been less than usual. Seen on Thursday at ED, bloodwork and chest x ray taken. Regularly irregular menstruation periods. No chance of pregnancy.

## 2024-02-26 LAB — BACTERIA UR CULT: NO GROWTH

## 2024-02-27 LAB
ATRIAL RATE: 75 BPM
P AXIS: 60 DEGREES
P OFFSET: 200 MS
P ONSET: 147 MS
PR INTERVAL: 136 MS
Q ONSET: 215 MS
QRS COUNT: 13 BEATS
QRS DURATION: 90 MS
QT INTERVAL: 384 MS
QTC CALCULATION(BAZETT): 428 MS
QTC FREDERICIA: 413 MS
R AXIS: 59 DEGREES
T AXIS: 43 DEGREES
T OFFSET: 407 MS
VENTRICULAR RATE: 75 BPM

## 2024-12-14 ENCOUNTER — HOSPITAL ENCOUNTER (EMERGENCY)
Facility: HOSPITAL | Age: 44
Discharge: HOME | End: 2024-12-14
Payer: COMMERCIAL

## 2024-12-14 ENCOUNTER — APPOINTMENT (OUTPATIENT)
Dept: RADIOLOGY | Facility: HOSPITAL | Age: 44
End: 2024-12-14
Payer: COMMERCIAL

## 2024-12-14 VITALS
DIASTOLIC BLOOD PRESSURE: 57 MMHG | HEIGHT: 66 IN | SYSTOLIC BLOOD PRESSURE: 116 MMHG | RESPIRATION RATE: 20 BRPM | HEART RATE: 76 BPM | BODY MASS INDEX: 27.32 KG/M2 | WEIGHT: 170 LBS | OXYGEN SATURATION: 100 % | TEMPERATURE: 98.2 F

## 2024-12-14 DIAGNOSIS — M54.41 ACUTE BILATERAL LOW BACK PAIN WITH BILATERAL SCIATICA: Primary | ICD-10-CM

## 2024-12-14 DIAGNOSIS — M54.42 ACUTE BILATERAL LOW BACK PAIN WITH BILATERAL SCIATICA: Primary | ICD-10-CM

## 2024-12-14 PROCEDURE — 2500000004 HC RX 250 GENERAL PHARMACY W/ HCPCS (ALT 636 FOR OP/ED)

## 2024-12-14 PROCEDURE — 72131 CT LUMBAR SPINE W/O DYE: CPT | Mod: FOREIGN READ | Performed by: RADIOLOGY

## 2024-12-14 PROCEDURE — 99284 EMERGENCY DEPT VISIT MOD MDM: CPT | Mod: 25

## 2024-12-14 PROCEDURE — 96372 THER/PROPH/DIAG INJ SC/IM: CPT

## 2024-12-14 PROCEDURE — 2500000005 HC RX 250 GENERAL PHARMACY W/O HCPCS

## 2024-12-14 PROCEDURE — 72131 CT LUMBAR SPINE W/O DYE: CPT

## 2024-12-14 PROCEDURE — 2500000001 HC RX 250 WO HCPCS SELF ADMINISTERED DRUGS (ALT 637 FOR MEDICARE OP)

## 2024-12-14 RX ORDER — TRAMADOL HYDROCHLORIDE 50 MG/1
50 TABLET ORAL ONCE
Status: COMPLETED | OUTPATIENT
Start: 2024-12-14 | End: 2024-12-14

## 2024-12-14 RX ORDER — ORPHENADRINE CITRATE 30 MG/ML
60 INJECTION INTRAMUSCULAR; INTRAVENOUS ONCE
Status: COMPLETED | OUTPATIENT
Start: 2024-12-14 | End: 2024-12-14

## 2024-12-14 RX ORDER — IBUPROFEN 800 MG/1
800 TABLET ORAL ONCE
Status: COMPLETED | OUTPATIENT
Start: 2024-12-14 | End: 2024-12-14

## 2024-12-14 RX ORDER — TRAMADOL HYDROCHLORIDE 50 MG/1
50 TABLET ORAL EVERY 6 HOURS PRN
Qty: 12 TABLET | Refills: 0 | Status: SHIPPED | OUTPATIENT
Start: 2024-12-14 | End: 2024-12-17

## 2024-12-14 RX ORDER — LIDOCAINE 50 MG/G
1 PATCH TOPICAL DAILY PRN
Qty: 10 PATCH | Refills: 0 | Status: SHIPPED | OUTPATIENT
Start: 2024-12-14 | End: 2024-12-24

## 2024-12-14 RX ORDER — LIDOCAINE 560 MG/1
1 PATCH PERCUTANEOUS; TOPICAL; TRANSDERMAL ONCE
Status: DISCONTINUED | OUTPATIENT
Start: 2024-12-14 | End: 2024-12-14 | Stop reason: HOSPADM

## 2024-12-14 RX ORDER — PREDNISONE 20 MG/1
40 TABLET ORAL DAILY
Qty: 10 TABLET | Refills: 0 | Status: SHIPPED | OUTPATIENT
Start: 2024-12-14 | End: 2024-12-19

## 2024-12-14 RX ORDER — METHOCARBAMOL 500 MG/1
500 TABLET, FILM COATED ORAL 2 TIMES DAILY
Qty: 20 TABLET | Refills: 0 | Status: SHIPPED | OUTPATIENT
Start: 2024-12-14 | End: 2024-12-24

## 2024-12-14 RX ADMIN — TRAMADOL HYDROCHLORIDE 50 MG: 50 TABLET, COATED ORAL at 11:56

## 2024-12-14 RX ADMIN — ORPHENADRINE CITRATE 60 MG: 60 INJECTION INTRAMUSCULAR; INTRAVENOUS at 11:56

## 2024-12-14 RX ADMIN — IBUPROFEN 800 MG: 800 TABLET, FILM COATED ORAL at 11:56

## 2024-12-14 RX ADMIN — LIDOCAINE 4% 1 PATCH: 40 PATCH TOPICAL at 11:56

## 2024-12-14 ASSESSMENT — COLUMBIA-SUICIDE SEVERITY RATING SCALE - C-SSRS
1. IN THE PAST MONTH, HAVE YOU WISHED YOU WERE DEAD OR WISHED YOU COULD GO TO SLEEP AND NOT WAKE UP?: NO
2. HAVE YOU ACTUALLY HAD ANY THOUGHTS OF KILLING YOURSELF?: NO
6. HAVE YOU EVER DONE ANYTHING, STARTED TO DO ANYTHING, OR PREPARED TO DO ANYTHING TO END YOUR LIFE?: NO

## 2024-12-14 ASSESSMENT — PAIN - FUNCTIONAL ASSESSMENT: PAIN_FUNCTIONAL_ASSESSMENT: 0-10

## 2024-12-14 ASSESSMENT — PAIN DESCRIPTION - LOCATION: LOCATION: BACK

## 2024-12-14 ASSESSMENT — PAIN SCALES - GENERAL: PAINLEVEL_OUTOF10: 10 - WORST POSSIBLE PAIN

## 2024-12-14 NOTE — ED TRIAGE NOTES
Pt from home via EMS, states she has been lifting heavy bags at work and now has pain in her back radiating down her legs.

## 2024-12-14 NOTE — ED PROVIDER NOTES
HPI   Chief Complaint   Patient presents with    Back Pain       Patient is a 44-year-old female presenting with low back pain via EMS.  She states that for the last several days, she has been having significant low back pain.  She states that she was diagnosed with a herniated disc in the past.  She states that the pain does radiate down her bilateral lower extremities.  Does endorse that she is not having urinary retention.  States she is having no bowel or bladder incontinence.  Denies high fevers.  Denies IV drug use.  States she still able to ambulate but states the pain is intense.  She is tried over-the-counter medications without relief.  Patient denies fevers, chills, cough, sore throat, runny nose, chest pain, shortness of breath, abdominal pain, nausea, vomiting, diarrhea or urinary complaints.              Patient History   No past medical history on file.  No past surgical history on file.  Family History   Problem Relation Name Age of Onset    Hypertension Mother      Heart disease Father      Asthma Sister       Social History     Tobacco Use    Smoking status: Every Day     Current packs/day: 0.50     Types: Cigarettes     Passive exposure: Current    Smokeless tobacco: Never   Vaping Use    Vaping status: Not on file   Substance Use Topics    Alcohol use: Yes     Comment: on occassion    Drug use: Never       Physical Exam   ED Triage Vitals [12/14/24 1140]   Temperature Heart Rate Respirations BP   36.8 °C (98.2 °F) 76 20 116/57      Pulse Ox Temp Source Heart Rate Source Patient Position   100 % Temporal -- Sitting      BP Location FiO2 (%)     -- --       Physical Exam  Vitals and nursing note reviewed.   Constitutional:       Appearance: She is well-developed.      Comments: Awake, uncomfortable appearing, sitting in examination chair   HENT:      Head: Normocephalic and atraumatic.      Nose: Nose normal.      Mouth/Throat:      Mouth: Mucous membranes are moist.      Pharynx: Oropharynx is  clear.   Eyes:      Extraocular Movements: Extraocular movements intact.      Conjunctiva/sclera: Conjunctivae normal.      Pupils: Pupils are equal, round, and reactive to light.   Cardiovascular:      Rate and Rhythm: Normal rate and regular rhythm.      Pulses: Normal pulses.      Heart sounds: Normal heart sounds. No murmur heard.  Pulmonary:      Effort: Pulmonary effort is normal. No respiratory distress.      Breath sounds: Normal breath sounds.   Abdominal:      General: Abdomen is flat.      Palpations: Abdomen is soft.      Tenderness: There is no abdominal tenderness.   Musculoskeletal:         General: No swelling. Normal range of motion.      Cervical back: Normal range of motion and neck supple.      Comments: Tenderness to palpation of the paraspinal muscles of the lumbar region   Skin:     General: Skin is warm and dry.      Capillary Refill: Capillary refill takes less than 2 seconds.   Neurological:      General: No focal deficit present.      Mental Status: She is alert and oriented to person, place, and time.   Psychiatric:         Mood and Affect: Mood normal.         Behavior: Behavior normal.           ED Course & MDM   Diagnoses as of 12/14/24 1422   Acute bilateral low back pain with bilateral sciatica                 No data recorded     [place] Coma Scale Score: 15 (12/14/24 1143 : Halley Contreras RN)                           Medical Decision Making  Patient is a 44-year-old female presenting with low back pain via EMS.  CT scan ordered.  Multimodal pain regimen ordered.  Conditions considered include but are not limited to: Disc herniation, sciatica, muscle spasm.    CT without acute compression deformity.  There is a grade 1 to spondylolisthesis of L5 vertebrae.  There is also foraminal stenosis that could be correlated to L5 radiculopathy.  There is disc disease at L5-S1.  There is a left adrenal adenoma.  Patient informed.  Patient is feeling improved after medications.    I believe  this patient is at low risk for complication, and a disposition of discharge is acceptable.  Return to the Emergency Department if new or worsening symptoms including headache, fever, chills, chest pain, shortness of breath, syncope, near syncope, abdominal pain, nausea, vomiting,  diarrhea, or worsening pain.  Prescription for muscle relaxer, tramadol, lidocaine patch, prednisone written.  Encourage patient to follow with primary care provider and orthopedic spinal provider.  Referral given.  Encourage patient to not operate heavy machinery while taking muscle relaxers and pain medications.  Patient is agreeable to a disposition of discharge and to follow with respective fields the next several days.  Also discussed using heat versus ice packs and over-the-counter medications in addition to prescription medications for symptom relief.    Portions of this note made with Dragon software, please be mindful of potential grammatical errors.        Medications   lidocaine 4 % patch 1 patch (1 patch transdermal Medication Applied 12/14/24 1156)   orphenadrine (Norflex) injection 60 mg (60 mg intramuscular Given 12/14/24 1156)   ibuprofen tablet 800 mg (800 mg oral Given 12/14/24 1156)   traMADol (Ultram) tablet 50 mg (50 mg oral Given 12/14/24 1156)         CT lumbar spine wo IV contrast   Final Result   1. No compression deformity is seen   2. Grade 1/2 spondylolisthesis at L5 vertebra secondary to bilateral   spondylolysis. Severe foraminal stenosis bilaterally which should be   correlated for L5 radiculopathy.   3. Advanced disc disease at L5-S1 with interspace narrowing.   4. Additional less pronounced disc disease elsewhere as discussed   above. Consider MRI lumbar spine for more sensitive assessment.   5. Left adrenal adenoma.   Signed by Kvng Sutherland MD            Procedure  Procedures     Jassi Kulkarni PA-C  12/14/24 1526

## 2024-12-14 NOTE — DISCHARGE INSTRUCTIONS
I would recommend using over-the-counter medications in addition to prescription medications for symptom relief.  Please follow with your primary provider with regards to the adrenal adenoma found on CAT scan.  Follow-up with orthopedic spinal provider as well for your chronic back issues.  Muscle relaxers may make you tired and I encourage you not to operate heavy machinery while on this medication.    Be sure to take all medications, over the counter medications or prescription medications only as directed.    Be sure to follow up as directed in 1-2 days. All of the details of your follow up instructions are detailed in the follow up section of this packet.    If you are being discharged with any pains medications or muscle relaxers (norco, Vicodin, hydrocodone products, Percocet, oxycodone products, flexeril, cyclobenzaprine, robaxin, norflex, brand or generic, or any other pain controlling medications with the exception of Ibuprofen and regular Tylenol, do not drive or operate machinery, climb ladders or participate in any activity that could potentially put yourself or others at risk should you get dizzy, or be/feel impaired at all.    Return to emergency room without delay for ANY new or worsening pains or for any other symptoms or concerns. Return with worsening pains, nausea, vomiting, trouble breathing, palpitations, shortness of breath, inability to pass stool or urine, loss of control of stool or urine, any numbness or tingling (that is not normal for you), uncontrolled fevers, the passing of blood or other material in stool or urine, rashes, pains or for any other symptoms or concerns you may have. You are always welcome to return to the ER at any time for any reason or for any other concerns you may have.

## 2024-12-14 NOTE — Clinical Note
Yumiko Carranza was seen and treated in our emergency department on 12/14/2024.  She may return to work on 12/16/2024.       If you have any questions or concerns, please don't hesitate to call.      Jassi Kulkarni PA-C

## 2024-12-23 ENCOUNTER — OFFICE VISIT (OUTPATIENT)
Dept: ORTHOPEDIC SURGERY | Facility: CLINIC | Age: 44
End: 2024-12-23
Payer: COMMERCIAL

## 2024-12-23 DIAGNOSIS — M47.812 CERVICAL SPONDYLOSIS: ICD-10-CM

## 2024-12-23 DIAGNOSIS — Q76.2 CONGENITAL SPONDYLOLISTHESIS OF LUMBOSACRAL REGION: Primary | ICD-10-CM

## 2024-12-23 PROCEDURE — 99204 OFFICE O/P NEW MOD 45 MIN: CPT | Performed by: ORTHOPAEDIC SURGERY

## 2024-12-23 PROCEDURE — 99214 OFFICE O/P EST MOD 30 MIN: CPT | Performed by: ORTHOPAEDIC SURGERY

## 2024-12-23 NOTE — PROGRESS NOTES
Chief Complaint: Resolved episode of back pain and lumbar radiculopathy  2  All previous Progress Notes and imaging results related to this patients chief complaint have been reviewed in preparation for this examination.    HPI: Yumiko Carranza is a 44 y.o. year old female patient with recent history of sudden spontaneous onset of severe low back pain which she first woke with 2 weeks ago.  It resolved that day but then recurred a week later so intensely that she went to the emergency department on December 14.  She was given steroids, CAT scan, gabapentin and muscle relaxant, Robaxin.  She is feeling better.  She does not like the feeling of the tramadol so she does not use it.  The prednisone is finished.  She still uses the muscle relaxant.  The pain was at the left posterior iliac spine.  It extended into both anterior thighs.  She is back to normal now.  In 2023 a flareup of neck stiffness and was given gabapentin at that time.    Prior spine surgeries: No    Physical Therapy: No  Other conservative care: No  Activity modification: No  Employment: [employer] is a [employer] which requires a fair amount of lifting  Exercise: No  Review of Systems    All other systems have been reviewed and are negative for complaint. All pertinent positive and negative as listed in history of present illness.    History reviewed. No pertinent past medical history.     History reviewed. No pertinent surgical history.     Allergies   Allergen Reactions    Other Other     SEAFOOD Bronchospasm        Current Outpatient Medications on File Prior to Visit   Medication Sig Dispense Refill    aluminum hydrox-magnesium carb (Acid Gone Antacid)  mg/15 mL suspension oral suspension Take 5 mL by mouth every 6 hours if needed for indigestion or heartburn. 355 mL 0    gabapentin (Neurontin) 300 mg capsule Take 1 capsule (300 mg) by mouth 2 times a day.      ibuprofen 600 mg tablet Take 1 tablet (600 mg) by mouth if needed.       lidocaine (Lidoderm) 5 % patch Place 1 patch over 12 hours on the skin once daily as needed for mild pain (1 - 3) for up to 10 days. Remove & discard patch within 12 hours or as directed by MD. 10 patch 0    methocarbamol (Robaxin) 500 mg tablet Take 1 tablet (500 mg) by mouth 2 times a day for 10 days. 20 tablet 0    [] predniSONE (Deltasone) 20 mg tablet Take 2 tablets (40 mg) by mouth once daily for 5 days. 10 tablet 0    [] traMADol (Ultram) 50 mg tablet Take 1 tablet (50 mg) by mouth every 6 hours if needed for severe pain (7 - 10) for up to 3 days. 12 tablet 0     No current facility-administered medications on file prior to visit.        Tobacco: Half pack a day  Anticoagulation : No    PE:   General: Patient appears  well-developed in no acute distress, Alert and Oriented x3  Psych: Pleasant mood and affect  HEENT: Extraocular muscles intact, pupils equal and round. Sclerae anicteric   Cardio: extremities warm and well perfused  Resp: unlabored symmetric breathing, no wheezing, SOB, cough.  Skin: no open wounds or rash  Musculoskeletal/Neuro Exam: Normal gait.  Heel walk: Normal, toe walk: Normal, single-leg stance: Normal, knee bend: Normal.   No tenderness to palpation along the iliac crest or spine.  Negative straight leg raise bilaterally.  No groin pain with ROM of the hip joints with IR and ER.  Trochanteric tenderness: No.  Neutral spinal balance. Lumbar extension: Normal. Toe-touch to ankles. Lumbar flexion painful: No. Shoulder balance: Level. Rib hump: No.    Lower extremity  Motor: Right leg with 5 out of 5 motor strength with hip flexion, knee extension, ankle dorsiflexion plantarflexion and EHL against resistance.  Left leg with 5 out of 5 motor strength with hip flexion, knee extension, ankle dorsiflexion plantarflexion EHL against resistance  Sensation to light touch intact along L2 to S1 distribution bilaterally  2+ patella and achilles reflex bilaterally.  No fasciculations,  atrophy, or edema.  Normal motor tone.  Negative Babinski no clonus   Skin warm, intact.  Normal capillary refill.        Imaging reviewed:  CT images from December 14 lumbar spine show a congenital spondylolisthesis grade 1-2 at L5-S1 with near complete loss of the disc space, endplate sclerosis, marginal osteophytes and foraminal narrowing.  Proximal lumbar spine is normal for her age.  The radiologist comments upon a adrenal adenoma.    She had a CT of the cervical spine in August 2023 which shows advanced spondylosis with anterior and posterior spurs at C6-7 a small posterior annular calcification at C4-5 but normal alignment and no foraminal narrowing.            Assessment   1. Congenital spondylolisthesis of lumbosacral region        2. Cervical spondylosis            A/P: Yumiko Carranza is a 44 y.o. year old female patient with mostly resolved episode of back and leg pains with the identification of a congenital spondylolisthesis.  Clearly she has had the structural abnormality for decades.  Her neuroexam is normal.  She is back to full function.  She does not need specific intervention.    Treatment or Intervention:  I have suggested she ask her primary care doctor to prescribe physical therapy.  I have strongly encouraged her to pursue a regular fitness program and pay attention to body mechanics, and avoid heavy lifting.  I have admonished her to quit smoking as well.        Follow-up if needed

## 2025-02-24 ENCOUNTER — OFFICE VISIT (OUTPATIENT)
Dept: URGENT CARE | Age: 45
End: 2025-02-24
Payer: COMMERCIAL

## 2025-02-24 VITALS
SYSTOLIC BLOOD PRESSURE: 120 MMHG | OXYGEN SATURATION: 99 % | RESPIRATION RATE: 16 BRPM | DIASTOLIC BLOOD PRESSURE: 70 MMHG | TEMPERATURE: 97.7 F | HEART RATE: 69 BPM

## 2025-02-24 DIAGNOSIS — L03.031 CELLULITIS OF TOE OF RIGHT FOOT: Primary | ICD-10-CM

## 2025-02-24 PROCEDURE — 99213 OFFICE O/P EST LOW 20 MIN: CPT | Performed by: PHYSICIAN ASSISTANT

## 2025-02-24 RX ORDER — CEPHALEXIN 500 MG/1
500 CAPSULE ORAL 3 TIMES DAILY
Qty: 21 CAPSULE | Refills: 0 | Status: SHIPPED | OUTPATIENT
Start: 2025-02-24 | End: 2025-03-03

## 2025-02-24 NOTE — PROGRESS NOTES
Subjective   Patient ID: Yumiko Carranza is a 44 y.o. female. They present today with a chief complaint of Toe Injury (Right pinky toe pain  x 1 day ).    History of Present Illness  Patient is a very pleasant 44-year-old female, no significant past medical history, presented to clinic with chief complaint of right pinky toe pain.  Patient is reporting 1 day history of increasing pain swelling and redness associated with her right pinky toe.  She denies any trauma or injury.  States she did apply a warm Epsom salt water soaks at home yesterday has been applying Neosporin without relief and therefore reported to clinic for further evaluation and assessment.  She is unsure if something bit her in her sleep.  Again no trauma or injury.  She denies any pulling off any hangnails.  No further complaints.          Past Medical History  Allergies as of 02/24/2025 - Reviewed 02/24/2025   Allergen Reaction Noted    Other Other 09/25/2023       (Not in a hospital admission)         No past medical history on file.    No past surgical history on file.     reports that she has been smoking cigarettes. She has been exposed to tobacco smoke. She has never used smokeless tobacco. She reports current alcohol use. She reports that she does not use drugs.    Review of Systems  Review of Systems                               Objective    Vitals:    02/24/25 1234   BP: 120/70   Pulse: 69   Resp: 16   Temp: 36.5 °C (97.7 °F)   SpO2: 99%     No LMP recorded (lmp unknown).    Physical Exam  General: Vitals Noted. No distress. Normocephalic.     HEENT: TMs normal, EOMI, normal conjunctiva, patent nares, Normal OP    Neck: Supple with no adenopathy.     Cardiac: Regular Rate and Rhythm. No murmur.     Pulmonary: Equal breath sounds bilaterally. No wheezes, rhonchi, or rales.    Abdomen: Soft, non-tender, with normal bowel sounds.     Musculoskeletal: Moves all extremities, no effusion, no edema.     Skin: Evaluation of the left fifth toe does  reveal diffuse erythema and edema throughout the toe.  There is no streaking into the foot.  Cap refill less than 2 seconds.  There is no associated fluctuance.  No purulent drainage.  Procedures    Point of Care Test & Imaging Results from this visit    No results found.    Diagnostic study results (if any) were reviewed by Chintan Mcgill PA-C.    Assessment/Plan   Allergies, medications, history, and pertinent labs/EKGs/Imaging reviewed by Chintan Mcgill PA-C.     Medical Decision Making  Patient was seen eval in the clinic for to complaint of right fifth toe pain.  On exam patient is nontoxic well-appearing respect comfortably no acute distress.  Vital signs are stable, afebrile.  Chest clear, it is regular, belly soft and nontender peer evaluation of left fifth toe as above most concerning for cellulitic process given lack of trauma or injury and clinical appearance of the toe being very red warm and tender.  Will place her on Keflex 500 mg 3 times a day for the next 7 days and advise she employ copious amounts of warm Epsom salt water soaks at home.  Advise she follow-up with her primary care physician in the next week.  Advised to report to the ED for any new or worsening symptoms.  Reviewed my impression, plan, strict return to support ED precautions with the patient.  She expresses understanding and agreement plan of care.      Orders and Diagnoses  Diagnoses and all orders for this visit:  Cellulitis of toe of right foot  -     cephalexin (Keflex) 500 mg capsule; Take 1 capsule (500 mg) by mouth 3 times a day for 7 days.        Medical Admin Record      Follow Up Instructions  No follow-ups on file.    Patient disposition: Home    Electronically signed by Chintan Mcgill PA-C  1:06 PM